# Patient Record
Sex: MALE | Employment: OTHER | URBAN - METROPOLITAN AREA
[De-identification: names, ages, dates, MRNs, and addresses within clinical notes are randomized per-mention and may not be internally consistent; named-entity substitution may affect disease eponyms.]

---

## 2019-06-05 ENCOUNTER — TRANSFERRED RECORDS (OUTPATIENT)
Dept: HEALTH INFORMATION MANAGEMENT | Facility: CLINIC | Age: 79
End: 2019-06-05

## 2019-08-15 ENCOUNTER — TRANSFERRED RECORDS (OUTPATIENT)
Dept: HEALTH INFORMATION MANAGEMENT | Facility: CLINIC | Age: 79
End: 2019-08-15

## 2019-08-31 ENCOUNTER — TRANSFERRED RECORDS (OUTPATIENT)
Dept: HEALTH INFORMATION MANAGEMENT | Facility: CLINIC | Age: 79
End: 2019-08-31

## 2019-11-06 ENCOUNTER — TRANSFERRED RECORDS (OUTPATIENT)
Dept: HEALTH INFORMATION MANAGEMENT | Facility: CLINIC | Age: 79
End: 2019-11-06

## 2019-11-22 ENCOUNTER — TRANSFERRED RECORDS (OUTPATIENT)
Dept: HEALTH INFORMATION MANAGEMENT | Facility: CLINIC | Age: 79
End: 2019-11-22

## 2019-12-12 ENCOUNTER — TRANSFERRED RECORDS (OUTPATIENT)
Dept: HEALTH INFORMATION MANAGEMENT | Facility: CLINIC | Age: 79
End: 2019-12-12

## 2019-12-13 ENCOUNTER — TRANSFERRED RECORDS (OUTPATIENT)
Dept: HEALTH INFORMATION MANAGEMENT | Facility: CLINIC | Age: 79
End: 2019-12-13

## 2019-12-17 ENCOUNTER — TRANSFERRED RECORDS (OUTPATIENT)
Dept: HEALTH INFORMATION MANAGEMENT | Facility: CLINIC | Age: 79
End: 2019-12-17

## 2020-01-01 ENCOUNTER — TRANSFERRED RECORDS (OUTPATIENT)
Dept: HEALTH INFORMATION MANAGEMENT | Facility: CLINIC | Age: 80
End: 2020-01-01

## 2020-01-06 ENCOUNTER — TRANSFERRED RECORDS (OUTPATIENT)
Dept: HEALTH INFORMATION MANAGEMENT | Facility: CLINIC | Age: 80
End: 2020-01-06

## 2020-01-31 ENCOUNTER — TRANSFERRED RECORDS (OUTPATIENT)
Dept: HEALTH INFORMATION MANAGEMENT | Facility: CLINIC | Age: 80
End: 2020-01-31

## 2020-03-13 ENCOUNTER — TRANSFERRED RECORDS (OUTPATIENT)
Dept: HEALTH INFORMATION MANAGEMENT | Facility: CLINIC | Age: 80
End: 2020-03-13

## 2021-01-01 ENCOUNTER — TRANSFERRED RECORDS (OUTPATIENT)
Dept: HEALTH INFORMATION MANAGEMENT | Facility: CLINIC | Age: 81
End: 2021-01-01

## 2021-01-01 ENCOUNTER — RECORDS - HEALTHEAST (OUTPATIENT)
Dept: LAB | Facility: CLINIC | Age: 81
End: 2021-01-01

## 2021-01-01 ENCOUNTER — HOSPITAL ENCOUNTER (OUTPATIENT)
Facility: CLINIC | Age: 81
Discharge: NURSING, REHAB OR LONG-TERM CARE | End: 2021-03-10
Attending: INTERNAL MEDICINE | Admitting: INTERNAL MEDICINE
Payer: COMMERCIAL

## 2021-01-01 ENCOUNTER — CARE COORDINATION (OUTPATIENT)
Dept: CARDIOLOGY | Facility: CLINIC | Age: 81
End: 2021-01-01

## 2021-01-01 ENCOUNTER — HOSPITAL ENCOUNTER (OUTPATIENT)
Dept: INFUSION THERAPY | Facility: CLINIC | Age: 81
End: 2021-03-10
Attending: INTERNAL MEDICINE
Payer: COMMERCIAL

## 2021-01-01 ENCOUNTER — OFFICE VISIT (OUTPATIENT)
Dept: CARDIOLOGY | Facility: CLINIC | Age: 81
End: 2021-01-01
Payer: COMMERCIAL

## 2021-01-01 VITALS
HEART RATE: 67 BPM | DIASTOLIC BLOOD PRESSURE: 56 MMHG | OXYGEN SATURATION: 94 % | RESPIRATION RATE: 22 BRPM | WEIGHT: 262.79 LBS | BODY MASS INDEX: 32 KG/M2 | SYSTOLIC BLOOD PRESSURE: 106 MMHG | TEMPERATURE: 97.2 F | HEIGHT: 76 IN

## 2021-01-01 VITALS
BODY MASS INDEX: 32.98 KG/M2 | HEART RATE: 69 BPM | SYSTOLIC BLOOD PRESSURE: 95 MMHG | OXYGEN SATURATION: 96 % | DIASTOLIC BLOOD PRESSURE: 57 MMHG | WEIGHT: 270.8 LBS | HEIGHT: 76 IN

## 2021-01-01 DIAGNOSIS — I50.82 BIVENTRICULAR HEART FAILURE (H): Primary | ICD-10-CM

## 2021-01-01 DIAGNOSIS — I50.82 BIVENTRICULAR HEART FAILURE (H): ICD-10-CM

## 2021-01-01 DIAGNOSIS — Z95.810 ICD (IMPLANTABLE CARDIOVERTER-DEFIBRILLATOR) IN PLACE: ICD-10-CM

## 2021-01-01 DIAGNOSIS — I50.23 ACUTE ON CHRONIC SYSTOLIC CONGESTIVE HEART FAILURE (H): Primary | ICD-10-CM

## 2021-01-01 DIAGNOSIS — I48.20 CHRONIC ATRIAL FIBRILLATION (H): ICD-10-CM

## 2021-01-01 DIAGNOSIS — R60.1 GENERALIZED EDEMA: ICD-10-CM

## 2021-01-01 LAB
ALBUMIN UR-MCNC: 30 MG/DL
APPEARANCE UR: ABNORMAL
BACTERIA #/AREA URNS HPF: ABNORMAL /HPF
BACTERIA SPEC CULT: NO GROWTH
BILIRUB UR QL STRIP: NEGATIVE
BUN SERPL-MCNC: 97 MG/DL (ref 7–30)
BUN SERPL-MCNC: 98 MG/DL (ref 7–30)
COLOR UR AUTO: ABNORMAL
CREAT SERPL-MCNC: 1.84 MG/DL (ref 0.66–1.25)
CREAT SERPL-MCNC: 1.89 MG/DL (ref 0.66–1.25)
DEPRECATED CALCIDIOL+CALCIFEROL SERPL-MC: 26 UG/L (ref 20–75)
ERYTHROCYTE [DISTWIDTH] IN BLOOD BY AUTOMATED COUNT: 20.3 % (ref 10–15)
GFR SERPL CREATININE-BSD FRML MDRD: 33 ML/MIN/{1.73_M2}
GFR SERPL CREATININE-BSD FRML MDRD: 34 ML/MIN/{1.73_M2}
GLUCOSE UR STRIP-MCNC: NEGATIVE MG/DL
HCT VFR BLD AUTO: 30.3 % (ref 40–53)
HGB BLD-MCNC: 9.5 G/DL (ref 13.3–17.7)
HGB UR QL STRIP: ABNORMAL
INR PPP: 1.56 (ref 0.9–1.1)
INR PPP: 2.71 (ref 0.86–1.14)
INR PPP: 4.52 (ref 0.9–1.1)
INR PPP: 5.31 (ref 0.9–1.1)
KETONES UR STRIP-MCNC: NEGATIVE MG/DL
LEUKOCYTE ESTERASE UR QL STRIP: ABNORMAL
Lab: NORMAL
MAGNESIUM SERPL-MCNC: 2.5 MG/DL (ref 1.6–2.3)
MCH RBC QN AUTO: 30.8 PG (ref 26.5–33)
MCHC RBC AUTO-ENTMCNC: 31.4 G/DL (ref 31.5–36.5)
MCV RBC AUTO: 98 FL (ref 78–100)
NITRATE UR QL: NEGATIVE
NT-PROBNP SERPL-MCNC: ABNORMAL PG/ML (ref 0–1800)
PH UR STRIP: 5.5 PH (ref 5–7)
PLATELET # BLD AUTO: 146 10E9/L (ref 150–450)
POTASSIUM SERPL-SCNC: 4.5 MMOL/L (ref 3.4–5.3)
POTASSIUM SERPL-SCNC: 4.6 MMOL/L (ref 3.4–5.3)
RBC # BLD AUTO: 3.08 10E12/L (ref 4.4–5.9)
RBC #/AREA URNS AUTO: 34 /HPF (ref 0–2)
SODIUM SERPL-SCNC: 138 MMOL/L (ref 133–144)
SOURCE: ABNORMAL
SP GR UR STRIP: 1.01 (ref 1–1.03)
SPECIMEN SOURCE: NORMAL
SQUAMOUS #/AREA URNS AUTO: 0 /HPF (ref 0–1)
T4 FREE SERPL-MCNC: 0.79 NG/DL (ref 0.76–1.46)
TSH SERPL DL<=0.005 MIU/L-ACNC: 6.91 MU/L (ref 0.4–4)
UROBILINOGEN UR STRIP-MCNC: 0 MG/DL (ref 0–2)
WBC # BLD AUTO: 6.5 10E9/L (ref 4–11)
WBC #/AREA URNS AUTO: >182 /HPF (ref 0–5)
WBC CLUMPS #/AREA URNS HPF: PRESENT /HPF

## 2021-01-01 PROCEDURE — 84520 ASSAY OF UREA NITROGEN: CPT | Mod: 91 | Performed by: PHYSICIAN ASSISTANT

## 2021-01-01 PROCEDURE — 36415 COLL VENOUS BLD VENIPUNCTURE: CPT

## 2021-01-01 PROCEDURE — 81001 URINALYSIS AUTO W/SCOPE: CPT | Performed by: PHYSICIAN ASSISTANT

## 2021-01-01 PROCEDURE — 96376 TX/PRO/DX INJ SAME DRUG ADON: CPT

## 2021-01-01 PROCEDURE — 250N000011 HC RX IP 250 OP 636: Performed by: PHYSICIAN ASSISTANT

## 2021-01-01 PROCEDURE — 36415 COLL VENOUS BLD VENIPUNCTURE: CPT | Performed by: PHYSICIAN ASSISTANT

## 2021-01-01 PROCEDURE — 96366 THER/PROPH/DIAG IV INF ADDON: CPT

## 2021-01-01 PROCEDURE — 84443 ASSAY THYROID STIM HORMONE: CPT | Performed by: PHYSICIAN ASSISTANT

## 2021-01-01 PROCEDURE — 99215 OFFICE O/P EST HI 40 MIN: CPT | Performed by: PHYSICIAN ASSISTANT

## 2021-01-01 PROCEDURE — 84439 ASSAY OF FREE THYROXINE: CPT | Performed by: PHYSICIAN ASSISTANT

## 2021-01-01 PROCEDURE — 85027 COMPLETE CBC AUTOMATED: CPT | Performed by: PHYSICIAN ASSISTANT

## 2021-01-01 PROCEDURE — 82565 ASSAY OF CREATININE: CPT | Performed by: PHYSICIAN ASSISTANT

## 2021-01-01 PROCEDURE — 84132 ASSAY OF SERUM POTASSIUM: CPT | Mod: 91 | Performed by: PHYSICIAN ASSISTANT

## 2021-01-01 PROCEDURE — 93000 ELECTROCARDIOGRAM COMPLETE: CPT | Performed by: INTERNAL MEDICINE

## 2021-01-01 PROCEDURE — 999N000087 HC STATISTIC IV OP-OVERFLOW

## 2021-01-01 PROCEDURE — 96374 THER/PROPH/DIAG INJ IV PUSH: CPT

## 2021-01-01 PROCEDURE — 84295 ASSAY OF SERUM SODIUM: CPT | Performed by: PHYSICIAN ASSISTANT

## 2021-01-01 PROCEDURE — 96365 THER/PROPH/DIAG IV INF INIT: CPT

## 2021-01-01 PROCEDURE — 99205 OFFICE O/P NEW HI 60 MIN: CPT | Performed by: INTERNAL MEDICINE

## 2021-01-01 PROCEDURE — 85610 PROTHROMBIN TIME: CPT | Performed by: PHYSICIAN ASSISTANT

## 2021-01-01 PROCEDURE — 83880 ASSAY OF NATRIURETIC PEPTIDE: CPT | Performed by: PHYSICIAN ASSISTANT

## 2021-01-01 PROCEDURE — 87086 URINE CULTURE/COLONY COUNT: CPT | Performed by: INTERNAL MEDICINE

## 2021-01-01 PROCEDURE — 83735 ASSAY OF MAGNESIUM: CPT | Performed by: PHYSICIAN ASSISTANT

## 2021-01-01 PROCEDURE — 82306 VITAMIN D 25 HYDROXY: CPT | Performed by: PHYSICIAN ASSISTANT

## 2021-01-01 PROCEDURE — 250N000009 HC RX 250: Performed by: PHYSICIAN ASSISTANT

## 2021-01-01 RX ORDER — SIMVASTATIN 20 MG
20 TABLET ORAL AT BEDTIME
COMMUNITY

## 2021-01-01 RX ORDER — WARFARIN SODIUM 2 MG/1
2 TABLET ORAL 2 TIMES DAILY
COMMUNITY

## 2021-01-01 RX ORDER — BUMETANIDE 2 MG/1
2 TABLET ORAL DAILY
Status: ON HOLD | COMMUNITY
End: 2021-01-01

## 2021-01-01 RX ORDER — BUMETANIDE 2 MG/1
2 TABLET ORAL 2 TIMES DAILY
Qty: 120 TABLET | Refills: 3 | Status: SHIPPED | OUTPATIENT
Start: 2021-01-01

## 2021-01-01 RX ORDER — BUMETANIDE 0.25 MG/ML
2 INJECTION INTRAMUSCULAR; INTRAVENOUS ONCE
Status: COMPLETED | OUTPATIENT
Start: 2021-01-01 | End: 2021-01-01

## 2021-01-01 RX ORDER — SPIRONOLACTONE 25 MG/1
25 TABLET ORAL DAILY
COMMUNITY

## 2021-01-01 RX ORDER — SODIUM CHLORIDE 9 MG/ML
INJECTION, SOLUTION INTRAVENOUS CONTINUOUS
Status: DISCONTINUED | OUTPATIENT
Start: 2021-01-01 | End: 2021-01-01 | Stop reason: HOSPADM

## 2021-01-01 RX ORDER — VIT C/VIT D3/E/ZINC/ELDERBERRY 65 MG-3.15
TABLET,CHEWABLE ORAL
COMMUNITY

## 2021-01-01 RX ORDER — METOLAZONE 5 MG/1
5 TABLET ORAL DAILY
COMMUNITY

## 2021-01-01 RX ORDER — METOPROLOL SUCCINATE 25 MG/1
25 TABLET, EXTENDED RELEASE ORAL DAILY
COMMUNITY

## 2021-01-01 RX ORDER — LIDOCAINE 40 MG/G
CREAM TOPICAL
Status: DISCONTINUED | OUTPATIENT
Start: 2021-01-01 | End: 2021-01-01 | Stop reason: HOSPADM

## 2021-01-01 RX ORDER — TAMSULOSIN HYDROCHLORIDE 0.4 MG/1
0.4 CAPSULE ORAL DAILY
COMMUNITY

## 2021-01-01 RX ADMIN — BUMETANIDE 2 MG: 0.25 INJECTION INTRAMUSCULAR; INTRAVENOUS at 12:51

## 2021-01-01 RX ADMIN — BUMETANIDE 1 MG/HR: 0.25 INJECTION INTRAMUSCULAR; INTRAVENOUS at 13:04

## 2021-01-01 ASSESSMENT — MIFFLIN-ST. JEOR
SCORE: 2044.84
SCORE: 2037.58
SCORE: 2008.5

## 2021-03-09 PROBLEM — E78.5 HYPERLIPIDEMIA: Status: ACTIVE | Noted: 2021-01-01

## 2021-03-09 PROBLEM — I25.10 CAD (CORONARY ARTERY DISEASE): Status: ACTIVE | Noted: 2021-01-01

## 2021-03-09 PROBLEM — I48.91 ATRIAL FIBRILLATION (H): Status: ACTIVE | Noted: 2021-01-01

## 2021-03-09 PROBLEM — E11.9 DIABETES MELLITUS, TYPE 2 (H): Status: ACTIVE | Noted: 2021-01-01

## 2021-03-09 PROBLEM — I10 HTN (HYPERTENSION): Status: ACTIVE | Noted: 2021-01-01

## 2021-03-09 NOTE — PROGRESS NOTES
[facility]    Alerted by scheduling team that Dr. Santo has requested Mr. Canales proceed with IV diuretic infusion 3/10.     Mr. Canales and his son agreed to 11:00 am check in at [facility]. I gave them instructions to HOLD oral bumex 3/9/2021 ONLY as he will receive similar medication via IV.    Will request [facility] CMA arrange [facility]es encounter 3/10 AM. Spoke with Care Suites to notify them of add on. CORE RN will request diuretic orders from Dr. Santo.    Reminder to RN board to complete post infusion assessment 3/11.     Future Appointments   Date Time Provider Department Center   3/10/2021 11:00 AM  [facility] BED 1 SUHFDI P PSA CLIN   3/22/2021 10:00 AM [facility] DCR2 [facility]C P PSA CLIN   3/23/2021  1:50 PM Aida Devi, APRN [facility] PSA CLIN     Dinorah Bosch RN BSN   4:26 PM 03/09/21      I huddled with Priya FORBES and Dr. Santo regarding infusion clinic tomorrow. Priya De Guzman PAC will place orders.     Dinorah Bosch RN BSN   4:56 PM 03/09/21

## 2021-03-09 NOTE — PROGRESS NOTES
CARDIOLOGY CLINIC CONSULT    REASON FOR CONSULT: CHF    PRIMARY CARE PHYSICIAN:  No primary care provider on file.    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  His ejection fraction is less than 20% and LV end-diastolic diameter 7.7 cm on an echo from 2021 according to records from his cardiologist in [place].  The etiology of his heart failure sounds as though it is primarily nonischemic though he does have underlying coronary disease which is diffuse and moderate to severe and and obtuse marginal and diagonal.  His wife  in September and he has been managing his care while living in [place] and moved to [place] a couple weeks ago to be closer to his son.  He is living in assisted living and establishing primary care through [facility].  He has chronic stage III heart failure with an ejection fraction of less than 20% and severely decreased RV function.  He has history of rheumatic heart disease with moderate aortic and mitral insufficiency and moderate or severe tricuspid insufficiency.  He is on chronic Bumex and also has a lymphedema pump.  He has had several hospitalizations over the past few months.    According to his previous cardiology notes and to the patient his baseline dry weight should probably be around 235 pounds.  He is probably 30 to 40 pounds above this at this point although states that his weight has been relatively stable over the past few weeks.  He has chronic orthopnea and 3+ edema of his legs.  He does not have chest pain or palpitations.    In discussing goals of care he does not want to be hospitalized under any circumstance but does want diuretic therapy for improvement of his cardiovascular symptoms.      PAST MEDICAL HISTORY:  Past Medical History:   Diagnosis Date     Aortic insufficiency       Benign hypertension with chronic kidney disease, stage III      Bifascicular block      Biventricular heart failure with reduced left ventricular function (H)     echo Jan 2021 Ef <20%. severe RV dysfunction.     Chronic atrial fibrillation (H)      CKD (chronic kidney disease) stage 3, GFR 30-59 ml/min      Coronary artery disease involving native coronary artery with unstable angina pectoris (H)     per outside records 2019 cath Moderate left main, LAD mild disease. 70% D1, Mild diffuse diseased LCx, OM1 60%, RCA codominant 55% stenosis.     Mitral regurgitation      Peripheral arterial disease (H)      Sinus node dysfunction (H)      Tricuspid regurgitation        MEDICATIONS:  Current Outpatient Medications   Medication     bumetanide (BUMEX) 2 MG tablet     metolazone (ZAROXOLYN) 5 MG tablet     metoprolol succinate ER (TOPROL-XL) 25 MG 24 hr tablet     Misc Natural Products (AIRBORNE ELDERBERRY) CHEW     sacubitril-valsartan (ENTRESTO) 24-26 MG per tablet     simvastatin (ZOCOR) 20 MG tablet     spironolactone (ALDACTONE) 25 MG tablet     tamsulosin (FLOMAX) 0.4 MG capsule     warfarin ANTICOAGULANT (JANTOVEN ANTICOAGULANT) 2 MG tablet     No current facility-administered medications for this visit.        ALLERGIES:  No Known Allergies      FAMILY HISTORY:  Former smoker.  Also see HPI    REVIEW OF SYSTEMS:  Skin:  Positive for bruising   Eyes:  Negative    ENT:  Positive for    Respiratory:  Positive for dyspnea on exertion;shortness of breath  Cardiovascular:    edema;Positive for;palpitations  Gastroenterology: Negative    Genitourinary:  Negative    Musculoskeletal:  Negative    Neurologic:  Negative    Psychiatric:  Negative    Heme/Lymph/Imm:  Negative    Endocrine:  Negative      PHYSICAL EXAM:      BP: 95/57(Machine) Pulse: 69     SpO2: 96 %      Vital Signs with Ranges  Pulse:  [69] 69  BP: (95)/(57) 95/57  SpO2:  [96 %] 96 %  270 lbs 12.8 oz    Constitutional: awake, alert, no distress  Eyes: sclera  nonicteric  ENT: trachea midline  Respiratory: Diminished breath sounds right lower lobe  Cardiovascular: Regular distant 2 out of 6 systolic murmur at apex.  JVP greater than 10 cm very prominent V wave  GI: nondistended, nontender, bowel sounds present  Skin: dry, no rash significant diffuse edema with 3+ pitting in the lower extremities.  He has periorbital and digital edema.  Neuropsychiatric: appropriate affect      DATA:   EKG ventricular paced rhythm    Echo reviewed and outside records see summary in history    Coronarary angiogram: No results found.      ASSESSMENT:  1. Biventricular heart failure with severely reduced severely dilated cardiomyopathy ejection fraction less than 20% NYHA stage III class C.  He is approaching stage IV.  He is on a beta-blocker, Entresto, spironolactone.  2. Chronic atrial fibrillation on Coumadin  3. ICD in place.  [employer] device implanted in 2020  4. CKD needs updated labs  5. Valvular heart disease.  Most recent echo with moderate aortic, mitral and tricuspid insufficiency    RECOMMENDATIONS:  6. CBC, CMP, BNP today  7. Referral to core clinic and infusion clinic.  Discussed with core clinic staff and he likely will be able to come in tomorrow to initiate infusion therapy.  We will need to follow-up on his labs prior to making that decision.  8. Referral to EP /device clinic for [employer] ICD  9. Release of information to coordinate with PCP through [facility].  10. Follow up needs to be ASAP. They would prefer [place] location as much as possible.  11. Patient prefers no hospitalization under any circumstance and wants to approach a more comfort-based care but does want diuretic therapy.  He would be appropriate for a palliative referral.    Yuliana Santo MD [facility]  Text Page

## 2021-03-09 NOTE — PATIENT INSTRUCTIONS
Refer to Core clinic for diuretics  Labs today  Refer to device clinic to monitor your ICD  Please sign release for [employer]

## 2021-03-10 PROBLEM — I50.23 ACUTE ON CHRONIC SYSTOLIC CONGESTIVE HEART FAILURE (H): Status: ACTIVE | Noted: 2021-01-01

## 2021-03-10 NOTE — PROGRESS NOTES
"CORE DIURETIC INFUSION VISIT  Yosef Canales  : 1940    Date: 3/10/2021  Primary Cardiologist: Dr. Santo, previously followed in [place] at [facility]    HPI:  Mr. Canales is a pleasant 80 year old gentleman with PMH significant for:  1.  NiCM with severe biventricular failure with EF <20% and LVEDD 7.7 cm  2.  CAD  3.  HTN  4.  CKD  5.  Severe TR, moderate MR  6.  AFib  7.  PAD  8.  HLD  9.  DMII  10 BPH and urinary retention  11.  BiV ICD in place  12. Ascites  13.  Recurrent pleural effusions with thoracentesis.     The pt moved from the [place] area to be closer to his son in mid march.  He was to move into assisted living at that time, but due to covid outbreak didn't move in until 3/1.  He notes that after his last hospitalization his wt was 220 but has been up to 266 now with severe progressive sob at rest, and peripheral edema.  This has continued to increase in spite of outpt diuretics.  He was seen by Dr. Santo yesterday in clinic who recommended admission for acute chf.  The patient politely refused and is brought to infusion clinic today for palliaition.      He continues to be sob at rest, have incontence, and severe peripheral edema.  Nurse has noted he has skin breakdown in his pelvic area, redness on his coccyx, and curdling around his penis.  Pt again \"respectfully requests we do the best we can with him as an oupt\".      Physical Exam:  Vitals: /45 (BP Location: Right arm)   Pulse 69   Temp 97.4  F (36.3  C) (Axillary)   Resp 18   Ht 1.93 m (6' 4\")   Wt 122.1 kg (269 lb 3.2 oz)   SpO2 93%   BMI 32.77 kg/m     Elderly chronically ill frail-appearing gentleman who is short of breath at rest and tachypneic.  His heart is regular with a 2 out of 6 systolic murmur heard best at lower left sternal border.  His lungs are diminished bilaterally.  He has 3+ pitting edema to his upper thighs.  His left arm is significantly bruised and from shoulder down " to mid forearm with some oozing areas.  He has JVP to his jaw.  He is especially short of breath with speaking.    ASSESSMENT & PLAN  Unfortunately, this is a complex very ill 80-year-old gentleman with biventricular failure with an EF less than 20%, severe tricuspid regurgitation, moderate mitral regurgitation, coronary artery disease, CKD, skin breakdown, BiV ICD in place, A. fib, urinary retention, BPH who presents today for IV diuretics for palliation.  This gentleman has stage D class IV heart failure and has had multiple admissions over the last 6 months.  We had a long discussion today that the ideal scenario would be to bring him into our hospital to see if we can optimize anything.  At this point he is up about 46 pounds from his dry weight and is clear we will not be able to get this off in infusion clinic.  The patient very politely declines and refuses hospitalization and states if he dies instead he would be okay with that.  He has been considering palliative and hospice but just has not gotten that far especially now that he is just getting settled in the [facility].    I had a long discussion with his son Wilfrido Canales ([phone]) and reviewed the difficult situation morning.  At this point he needs care for his heart failure, his skin breakdown, urinary retention, and possible urinary tract infection.  We both agree that the patient is competent and well his son Wilfrido has power of  he will go along with his father's wishes.  The family has recently lost 2 family members who have been on hospice in the last 6 months and the son is aware that his father is on the track.  He is comfortable with us doing the best we can today, adjusting medications, getting wound care, urology, and a palliative/hospice team involved on the patient's discharge.  We discussed that there would be a very small chance, likely less than 5% that even if we brought him in the hospital for tune up we be able to turn  this patient around.    For this reason, we will continue with diuresis today, UA is pending as are other labs.  At the moment will take a palliative approach including not worrying very much about his renal function and focusing more on trying to diurese him as an outpatient and get his symptoms improved.  For that reason I will increase his Bumex to 2 mg twice daily and he will continue on metolazone 5 mg daily in addition to his 25 of spironolactone.  I will not make any other medication changes I strongly encourage the patient and family to move towards hospice unless he is willing to be admitted to the hospital for tomorrow.    Thank you for letting me to precipitate in this patient's care.  120 minutes was spent on review of chart and discussion with the family and patient.  Priya De Guzman PA-C 3/10/2021 12:48 PM    ADDENDUM:  Called [facility] whom provide primary care for pt at his living facility [facility] and spoke with Laurence Das NP (triage number [phone]) regarding abnormal UA and extensive f/u needs for this patient.  She has been working with the family around home care orders, lymphedema etc.  After discussion, we agreed that we would leave his quiroga catheter in place, she would prescribe antibiotics for UTI, arrange for wound care, home care, and coordination with palliative and hospice.  We agreed that increasing outpt diuretics for palliation with less concern for renal function was worthwhile as well.  She was outstanding to coordinate with and I greatly appreciate her assistance.      Vitals:    03/10/21 1147 03/10/21 1712   Weight: 122.1 kg (269 lb 3.2 oz) 119.2 kg (262 lb 12.6 oz)       Intake/Output Summary (Last 24 hours) at 3/10/2021 1738  Last data filed at 3/10/2021 1708  Gross per 24 hour   Intake 100 ml   Output 2250 ml   Net -2150 ml     Priya De Guzman PA-C 5:38 PM         Orders this Visit:  Orders Placed This Encounter   Procedures     Urea nitrogen      Urea nitrogen     Creatinine     Creatinine     Magnesium     Potassium     Potassium     Sodium     Potassium     EKG 12-lead, tracing only     Vital Signs     Pulse oximetry nursing     Weigh patient     Measure urine output     Patient Teaching: Heart Failure     Peripheral IV catheter     Activity: Up ad mila     Notify Provider     Notify Provider     May discharge when: other (specify in comments) IF Systolic blood pressure is greater than or equal to 90 mmHg AND patient is ambulatory without symptoms.     Discharge planning     Oxygen: Nasal cannula     Orders Placed This Encounter   Medications     lidocaine 1 % 0.1-1 mL     lidocaine (LMX4) cream     sodium chloride (PF) 0.9% PF flush 3 mL     sodium chloride (PF) 0.9% PF flush 3 mL     bumetanide (BUMEX) injection 2 mg     bumetanide (BUMEX) 0.25 mg/mL infusion     sodium chloride 0.9% infusion     There are no discontinued medications.      CURRENT MEDICATIONS:  Medications Prior to Admission   Medication Sig Dispense Refill Last Dose     metolazone (ZAROXOLYN) 5 MG tablet Take 5 mg by mouth daily        metoprolol succinate ER (TOPROL-XL) 25 MG 24 hr tablet Take 25 mg by mouth daily        Misc Natural Products (AIRBORNE ELDERBERRY) CHEW         sacubitril-valsartan (ENTRESTO) 24-26 MG per tablet Take 1 tablet by mouth daily 26 MG        simvastatin (ZOCOR) 20 MG tablet Take 20 mg by mouth At Bedtime        spironolactone (ALDACTONE) 25 MG tablet Take 25 mg by mouth daily Half a tablet M, W, F.        tamsulosin (FLOMAX) 0.4 MG capsule Take 0.4 mg by mouth daily        warfarin ANTICOAGULANT (JANTOVEN ANTICOAGULANT) 2 MG tablet Take 2 mg by mouth 2 times daily        [DISCONTINUED] bumetanide (BUMEX) 2 MG tablet Take 2 mg by mouth daily          ALLERGIES   No Known Allergies    PAST MEDICAL HISTORY:  Past Medical History:   Diagnosis Date     Aortic insufficiency      Benign hypertension with chronic kidney disease, stage III      Bifascicular block       Biventricular heart failure with reduced left ventricular function (H)     echo Jan 2021 Ef <20%. severe RV dysfunction.     Chronic atrial fibrillation (H)      CKD (chronic kidney disease) stage 3, GFR 30-59 ml/min      Coronary artery disease involving native coronary artery with unstable angina pectoris (H)     per outside records 2019 cath Moderate left main, LAD mild disease. 70% D1, Mild diffuse diseased LCx, OM1 60%, RCA codominant 55% stenosis.     DM2 (diabetes mellitus, type 2) (H)      ICD (implantable cardioverter-defibrillator) in place 2020    [employer]'s CRT-D     Mitral regurgitation      Peripheral arterial disease (H)      Sinus node dysfunction (H)      Tricuspid regurgitation        PAST SURGICAL HISTORY:  History reviewed. No pertinent surgical history.    FAMILY HISTORY:  History reviewed. No pertinent family history.    SOCIAL HISTORY:  Social History     Socioeconomic History     Marital status:      Spouse name: None     Number of children: None     Years of education: None     Highest education level: None   Occupational History     None   Social Needs     Financial resource strain: None     Food insecurity     Worry: None     Inability: None     Transportation needs     Medical: None     Non-medical: None   Tobacco Use     Smoking status: Former Smoker     Smokeless tobacco: Never Used   Substance and Sexual Activity     Alcohol use: Not Currently     Drug use: None     Sexual activity: None   Lifestyle     Physical activity     Days per week: None     Minutes per session: None     Stress: None   Relationships     Social connections     Talks on phone: None     Gets together: None     Attends Restorationist service: None     Active member of club or organization: None     Attends meetings of clubs or organizations: None     Relationship status: None     Intimate partner violence     Fear of current or ex partner: None     Emotionally abused: None     Physically abused: None     Forced  sexual activity: None   Other Topics Concern     Parent/sibling w/ CABG, MI or angioplasty before 65F 55M? Not Asked   Social History Narrative     None       Review of Systems:  10 point review of systems negative except as listed in HPI.    Recent Lab Results:  LIPID RESULTS:  No results found for: CHOL, HDL, LDL, TRIG, CHOLHDLRATIO    LIVER ENZYME RESULTS:  No results found for: AST, ALT    CBC RESULTS:  No results found for: WBC, RBC, HGB, HCT, MCV, MCH, MCHC, RDW, PLT    BMP RESULTS:  Lab Results   Component Value Date     03/10/2021    POTASSIUM 4.6 03/10/2021    BUN 98 (H) 03/10/2021    CR 1.89 (H) 03/10/2021    GFRESTIMATED 33 (L) 03/10/2021    GFRESTBLACK 38 (L) 03/10/2021        A1C RESULTS:  No results found for: A1C    INR RESULTS:  No results found for: INR      CC  No referring provider defined for this encounter.

## 2021-03-10 NOTE — PROGRESS NOTES
[facility] Discharge Nursing Note    Patient Information  Name: Yosef Canales  Age: 80 year old    Discharge Education:  Discharge instructions reviewed: Yes  Additional education/resources provided:yes- reviewed by Priya De Guzman with TAB Das- copies sent with patient to assisted living  Patient/patient representative verbalizes understanding: Yes  Patient discharging on new medications: No  Medication education completed: Medication dose changes discussed with aKity Das NP at [facility]    Discharge Plans:   Discharge location: home  Discharge ride contacted: Yes  Approximate discharge time: 1740    Discharge Criteria:  Discharge criteria met and vital signs stable: Yes    Patient Belongs:  Patient belongings returned to patient: Yes    Pre-infusion weight is 122.1 kg  Post-infusion weight is 119.2 kg    Ashlee Fontanez RN

## 2021-03-10 NOTE — DISCHARGE INSTRUCTIONS
ORDERS:  Increase bumex to 2 mg bid.    Continue other medications    Wrap legs with ace wraps daily, on in the morning off at night, or use patients wraps.  Wound care to see pt for coccyx skin breakdown, and penile redness within 24 hours.   Urology to see patient within 48 hours.   Adrian to be emptied q4 hours or sooner as needed.  Palliative/ hospice consult- can be arranged through [facility] if not available through facility.  Plan was reviewed and discussed with his son Wilfrido Canales.

## 2021-03-10 NOTE — PROGRESS NOTES
Patient has urinary retention with a hydrocele, bilateral groin skin breakdown.  Penile curd discharge. Obtained order to start a urethral catheter by timothy ANAND  Catheter placed without difficulty.

## 2021-03-10 NOTE — PROGRESS NOTES
Care Suites Admission Nursing Note    Patient Information  Name: Yosef Canales  Age: 80 year old  Reason for admission: CHF infusion   Care Suites arrival time: 1230    Patient Admission/Assessment   Pre-procedure assessment complete: Yes  If abnormal assessment/labs, provider notified: N/A  NPO: Yes  Medications held per instructions/orders: Yes  Consent: deferred  If applicable, pregnancy test status: deferred  Patient oriented to room: Yes  Education/questions answered: Yes  Plan/other: Bumex infusion     Discharge Planning  Discharge name/phone number: gunjan [phone]  Discharge location: home (with home nursing services to [facility] )     Augustus Mc RN

## 2021-03-11 NOTE — PROGRESS NOTES
[facility]  Post Diuretic Infusion      Diuretic received during infusion    bumex 2mg IVP -> 1mg/hr X4hrs     Recent weights:   3/11 260(home weight)   3/10 pre infusion: 269#    post infusion: 262.2#  Current goal wt: ~220-230#  Current daily diuretic:    bumex 2mg bid(increased after infusion clinic 3/10)   Metolazone 5mg/d   Spironolactone 25mg qd       HF symptoms: Patient today stating he feels better. He stated his breathing is better and as I talk to him he is not SOB w/talking. His son confirmed that he is not SOB at rest this am. He was eating breakfast at the time of my call. Patient also stated the pressure he felt in the R side of his chest has resolved. He slept last night is a recliner, but slept through the night.     I confirmed w/patient and son his upcoming appt and insured they have CORE contact information. They has no other questions.  Cecilia Emmanuel RN on 3/11/2021 at 10:22 AM    Future Appointments   Date Time Provider Department Center   3/22/2021 10:00 AM [facility] DCR2 [facility] PSA CLIN   3/23/2021  1:50 PM [facility] Devi [facility]

## 2021-03-11 NOTE — PROGRESS NOTES
[facility] CORE Clinic    Addressed lab results per Priya De Guzman:  Low normal vit d, drawn for pt while in infusion.  Free t4 is low normal also.  Please fax results to care facility for their NP to address.    Contacted patients [facility]) for fax#, spoke w/nurse Lynn. She stated patient and son are having informational meeting w/hospice today. She feels he will choose to enroll, but will await formal decision.     Faxed labs w/request for NP to address. Cecilia Emmanuel RN on 3/11/2021 at 1:00 PM

## 2021-03-19 NOTE — PROGRESS NOTES
[facility] CORE Clinic    Incoming message from patients son Wilfrido w/questions about his ICD. Per message, patient on hospice w/ death estimated w/i the next 48-72hrs. Wilfrido is asking how to deactivate the ICD. Per record review and discussion w/device, we have not sen him in our device clinic, thus do not have access to it.     Call back to patients son, HERMAN full, will repeat attempt to connect w/him.  Cecilia Emmanuel RN on 3/19/2021 at 9:37 AM    Son Wilfrido calling back. I informed him of what I had discovered. He will connect w/patients cardiologist in [place]. Wilfrido had no other questions.  Cecilia Emmanuel RN on 3/19/2021 at 9:44 AM

## (undated) RX ORDER — BUMETANIDE 0.25 MG/ML
INJECTION INTRAMUSCULAR; INTRAVENOUS
Status: DISPENSED
Start: 2021-01-01